# Patient Record
Sex: FEMALE | Race: WHITE | NOT HISPANIC OR LATINO | Employment: UNEMPLOYED | URBAN - NONMETROPOLITAN AREA
[De-identification: names, ages, dates, MRNs, and addresses within clinical notes are randomized per-mention and may not be internally consistent; named-entity substitution may affect disease eponyms.]

---

## 2017-11-07 ENCOUNTER — OFFICE VISIT (OUTPATIENT)
Dept: URGENT CARE | Facility: PHYSICIAN GROUP | Age: 57
End: 2017-11-07
Payer: OTHER GOVERNMENT

## 2017-11-07 VITALS
RESPIRATION RATE: 18 BRPM | BODY MASS INDEX: 22.49 KG/M2 | SYSTOLIC BLOOD PRESSURE: 120 MMHG | DIASTOLIC BLOOD PRESSURE: 64 MMHG | HEART RATE: 88 BPM | TEMPERATURE: 98 F | OXYGEN SATURATION: 100 % | HEIGHT: 65 IN | WEIGHT: 135 LBS

## 2017-11-07 DIAGNOSIS — F43.21 GRIEF REACTION: ICD-10-CM

## 2017-11-07 PROCEDURE — 99203 OFFICE O/P NEW LOW 30 MIN: CPT | Performed by: PHYSICIAN ASSISTANT

## 2017-11-07 RX ORDER — HYDROCORTISONE 20 MG/1
20 TABLET ORAL DAILY
COMMUNITY

## 2017-11-07 RX ORDER — FLUOXETINE HYDROCHLORIDE 20 MG/1
20 CAPSULE ORAL DAILY
Qty: 30 CAP | Refills: 0 | Status: SHIPPED | OUTPATIENT
Start: 2017-11-07

## 2017-11-07 RX ORDER — LEVOTHYROXINE SODIUM 0.05 MG/1
50 TABLET ORAL
COMMUNITY

## 2017-11-07 ASSESSMENT — ENCOUNTER SYMPTOMS
DEPRESSION: 1
ABDOMINAL PAIN: 0
VOMITING: 0
NERVOUS/ANXIOUS: 1
INSOMNIA: 1

## 2017-11-07 NOTE — PATIENT INSTRUCTIONS
Generalized Anxiety Disorder  Generalized anxiety disorder (NINI) is a mental disorder. It interferes with life functions, including relationships, work, and school.  NINI is different from normal anxiety, which everyone experiences at some point in their lives in response to specific life events and activities. Normal anxiety actually helps us prepare for and get through these life events and activities. Normal anxiety goes away after the event or activity is over.   NINI causes anxiety that is not necessarily related to specific events or activities. It also causes excess anxiety in proportion to specific events or activities. The anxiety associated with NINI is also difficult to control. NINI can vary from mild to severe. People with severe NINI can have intense waves of anxiety with physical symptoms (panic attacks).   SYMPTOMS  The anxiety and worry associated with NINI are difficult to control. This anxiety and worry are related to many life events and activities and also occur more days than not for 6 months or longer. People with NINI also have three or more of the following symptoms (one or more in children):  · Restlessness.    · Fatigue.  · Difficulty concentrating.    · Irritability.  · Muscle tension.  · Difficulty sleeping or unsatisfying sleep.  DIAGNOSIS  NINI is diagnosed through an assessment by your health care provider. Your health care provider will ask you questions about your mood, physical symptoms, and events in your life. Your health care provider may ask you about your medical history and use of alcohol or drugs, including prescription medicines. Your health care provider may also do a physical exam and blood tests. Certain medical conditions and the use of certain substances can cause symptoms similar to those associated with NINI. Your health care provider may refer you to a mental health specialist for further evaluation.  TREATMENT  The following therapies are usually used to treat NINI:    · Medication. Antidepressant medication usually is prescribed for long-term daily control. Antianxiety medicines may be added in severe cases, especially when panic attacks occur.    · Talk therapy (psychotherapy). Certain types of talk therapy can be helpful in treating NINI by providing support, education, and guidance. A form of talk therapy called cognitive behavioral therapy can teach you healthy ways to think about and react to daily life events and activities.  · Stress management techniques. These include yoga, meditation, and exercise and can be very helpful when they are practiced regularly.  A mental health specialist can help determine which treatment is best for you. Some people see improvement with one therapy. However, other people require a combination of therapies.     This information is not intended to replace advice given to you by your health care provider. Make sure you discuss any questions you have with your health care provider.     Document Released: 04/14/2014 Document Revised: 01/08/2016 Document Reviewed: 04/14/2014  [employer] Interactive Patient Education ©2016 [employer]  Complicated Grieving  Grief is a normal response to the death of someone close to you. Feelings of fear, anger, and guilt can affect almost everyone who loses a loved one. It is also common to have symptoms of depression while you are grieving. These include problems with sleep, loss of appetite, and lack of energy. They may last for weeks or months after a loss.  Complicated grief is different from normal grief or depression. Normal grieving involves sadness and feelings of loss, but these feelings are not constant. Complicated grief is a constant and severe type of grief. It interferes with your ability to function normally. It may last for several months to a year or longer. Complicated grief may require treatment from a mental health care provider.  CAUSES   It is not known why some people continue to struggle  with grief and others do not. You may be at higher risk for complicated grief if:  · The death of your loved one was sudden or unexpected.  · The death of your loved one was due to a violent event.  · Your loved one committed suicide.  · Your loved one was a child or a young person.  · You were very close to or dependent on the loved one.  · You have a history of depression.  SIGNS AND SYMPTOMS  Signs and symptoms of complicated grief may include:  · Feeling disbelief or numbness.  · Being unable to enjoy good memories of your loved one.  · Needing to avoid anything that reminds you of your loved one.  · Being unable to stop thinking about the death.  · Feeling intense anger or guilt.  · Feeling alone and hopeless.  · Feeling that your life is meaningless and empty.  · Losing the desire to live.  DIAGNOSIS  Your health care provider may diagnose complicated grief if:  · You have constant symptoms of grief for 6-12 months or longer.  · Your symptoms are interfering with your ability to live your life.  Your health care provider may want you to see a mental health care provider. Many symptoms of depression are similar to the symptoms of complicated grief. It is important to be evaluated for complicated grief along with other mental health conditions.  TREATMENT   Talk therapy with a mental health provider is the most common treatment for complicated grief. During therapy, you will learn heathy ways to cope with the loss of your loved one. In some cases, your mental health care provider may also recommend antidepressant medicines.  HOME CARE INSTRUCTIONS  · Take care of yourself.  ¨ Eat regular meals and maintain a healthy diet. Eat plenty of fruits, vegetables, and whole grains.  ¨ Try to get some exercise each day.  ¨ Keep regular hours for sleep. Try to get at least 8 hours of sleep each night.  · Do not use drugs or alcohol to ease your symptoms.  · Take medicines only as directed by your health care  provider.  · Spend time with friends and loved ones.  · Consider joining a grief (bereavement) support group to help you deal with your loss.  · Keep all follow-up visits as directed by your health care provider. This is important.  SEEK MEDICAL CARE IF:  · Your symptoms keep you from functioning normally.  · Your symptoms do not get better with treatment.  SEEK IMMEDIATE MEDICAL CARE IF:  · You have serious thoughts of hurting yourself or someone else.  · You have suicidal feelings.     This information is not intended to replace advice given to you by your health care provider. Make sure you discuss any questions you have with your health care provider.     Document Released: 12/18/2006 Document Revised: 01/08/2016 Document Reviewed: 05/28/2015  [employer] Interactive Patient Education ©2016 [employer].

## 2017-11-07 NOTE — PROGRESS NOTES
"Subjective:      Kiki Dimas is a 57 y.o. female who presents with Anxiety            Patient presents today with anxiety, depression, and grieving after the loss of her . She reports that her  has been sick for quite some time and he passed away this morning. Over the last week or so she has been quite anxious and has been crying very frequently. She also reports extreme difficulty sleeping due to the anxiety and depression. She would like to start some medication may help her through this grieving process. She does have family and friends close by who are very supportive.  She denies any prior history of anxiety or depression. She has never been on medication for these symptoms before. Denies any suicidal ideation. She is able to care for herself.        Review of Systems   Cardiovascular: Negative for chest pain.   Gastrointestinal: Negative for abdominal pain and vomiting.   Psychiatric/Behavioral: Positive for depression. Negative for suicidal ideas. The patient is nervous/anxious and has insomnia.        Allergies:Review of patient's allergies indicates no known allergies.    Current Outpatient Prescriptions Ordered in [facility]   Medication Sig Dispense Refill   • hydrocortisone (CORTEF) 20 MG Tab Take 20 mg by mouth every day.     • levothyroxine (SYNTHROID) 50 MCG Tab Take 50 mcg by mouth Every morning on an empty stomach.     • METFORMIN HCL PO Take  by mouth.     • fluoxetine (PROZAC) 20 MG Cap Take 1 Cap by mouth every day. 30 Cap 0     No current Epic-ordered facility-administered medications on file.        Past Medical History:   Diagnosis Date   • Thyroid disease        Social History   Substance Use Topics   • Smoking status: Never Smoker   • Smokeless tobacco: Never Used   • Alcohol use No       No family status information on file.   History reviewed. No pertinent family history.     Objective:     /64   Pulse 88   Temp 36.7 °C (98 °F)   Resp 18   Ht 1.651 m (5' 5\")   Wt 61.2 " kg (135 lb)   SpO2 100%   Breastfeeding? No   BMI 22.47 kg/m²      Physical Exam   Constitutional: She is oriented to person, place, and time. She appears well-developed and well-nourished. No distress.   HENT:   Head: Normocephalic and atraumatic.   Right Ear: External ear normal.   Left Ear: External ear normal.   Neck: Normal range of motion. Neck supple.   Cardiovascular: Normal rate.    Pulmonary/Chest: Effort normal.   Neurological: She is alert and oriented to person, place, and time.   Skin: Skin is warm and dry. She is not diaphoretic.   Psychiatric: Her speech is normal and behavior is normal. Judgment and thought content normal. Cognition and memory are normal. She exhibits a depressed mood.   Tearful at times   Nursing note and vitals reviewed.              Assessment/Plan:     1. Grief reaction  fluoxetine (PROZAC) 20 MG Cap    Secondary to the passing of her . Anxiety and depression today. Discussed options. Start Prozac. OTC sleep aids as needed. Follow-up with Dr. Vasquez as [employer] Interactive Patient Education given: Grief, anxiety    Please note that this dictation was created using voice recognition software. I have made every reasonable attempt to correct obvious errors, but I expect that there are errors of grammar and possibly content that I did not discover before finalizing the note.

## 2019-11-29 ENCOUNTER — OFFICE VISIT (OUTPATIENT)
Dept: RETAIL CLINIC | Facility: CLINIC | Age: 59
End: 2019-11-29

## 2019-11-29 VITALS
SYSTOLIC BLOOD PRESSURE: 119 MMHG | RESPIRATION RATE: 16 BRPM | OXYGEN SATURATION: 99 % | DIASTOLIC BLOOD PRESSURE: 85 MMHG | HEART RATE: 75 BPM

## 2019-11-29 DIAGNOSIS — Z76.0 MEDICATION REFILL: ICD-10-CM

## 2019-11-29 DIAGNOSIS — E03.9 HYPOTHYROIDISM, UNSPECIFIED TYPE: Primary | ICD-10-CM

## 2019-11-29 PROCEDURE — 99213 OFFICE O/P EST LOW 20 MIN: CPT | Performed by: NURSE PRACTITIONER

## 2019-11-29 RX ORDER — LEVOTHYROXINE SODIUM 0.07 MG/1
TABLET ORAL
Refills: 0 | COMMUNITY
Start: 2019-08-29 | End: 2019-11-29 | Stop reason: SDUPTHER

## 2019-11-29 RX ORDER — LEVOTHYROXINE SODIUM 0.07 MG/1
75 TABLET ORAL DAILY
Qty: 34 TABLET | Refills: 0 | Status: SHIPPED | OUTPATIENT
Start: 2019-11-29

## 2019-11-29 RX ORDER — POTASSIUM CHLORIDE 750 MG/1
10 TABLET, FILM COATED, EXTENDED RELEASE ORAL 2 TIMES DAILY
Refills: 3 | COMMUNITY
Start: 2019-10-09

## 2019-11-29 RX ORDER — FLUDROCORTISONE ACETATE 0.1 MG/1
TABLET ORAL DAILY
Refills: 4 | COMMUNITY
Start: 2019-10-21

## 2019-11-29 RX ORDER — SIMVASTATIN 20 MG
20 TABLET ORAL DAILY
Refills: 2 | COMMUNITY
Start: 2019-11-12

## 2019-11-29 NOTE — PROGRESS NOTES
Chief Complaint   Patient presents with   • Med Refill     Subjective   Kay Mack is a 59 y.o. female who presents to the clinic today with complaints of: was called here from out of town to care for her mother for several weeks and is almost out of her Levothyroxine. She needs a refill today or she will need to cut them in half. She brought the medication bottle her. She has had a complete thyroidectomy.   HPI      Current Outpatient Medications:   •  fludrocortisone 0.1 MG tablet, Take  by mouth Daily., Disp: , Rfl: 4  •  metFORMIN (GLUCOPHAGE) 500 MG tablet, Take 500 mg by mouth Daily., Disp: , Rfl: 2  •  potassium chloride (K-DUR) 10 MEQ CR tablet, Take 10 mEq by mouth 2 (Two) Times a Day., Disp: , Rfl: 3  •  simvastatin (ZOCOR) 20 MG tablet, Take 20 mg by mouth Daily., Disp: , Rfl: 2  •  levothyroxine (SYNTHROID) 75 MCG tablet, Take 1 tablet by mouth Daily. On Monday-Saturday and 2 tabs on Sunday, Disp: 34 tablet, Rfl: 0    Allergies:  Patient has no known allergies.    Past Medical History:   Diagnosis Date   • Disease of thyroid gland    • Elevated cholesterol      Past Surgical History:   Procedure Laterality Date   • CHOLECYSTECTOMY     • HYSTERECTOMY     • THYROIDECTOMY       No family history on file.  Social History     Tobacco Use   • Smoking status: Former Smoker   • Smokeless tobacco: Never Used   • Tobacco comment: quit in 1995   Substance Use Topics   • Alcohol use: Yes     Comment: seldom   • Drug use: No       Review of Systems  Review of Systems   Constitutional: Negative for activity change, appetite change and fatigue.   Respiratory: Negative.    Cardiovascular: Negative.    Gastrointestinal: Negative for constipation, diarrhea, nausea and vomiting.   Endocrine: Negative for cold intolerance and heat intolerance.   Skin: Negative for pallor.   Neurological: Negative for dizziness and headaches.       Objective   /85 (BP Location: Left arm, Patient Position: Sitting, Cuff Size: Adult)    Pulse 75   Resp 16   SpO2 99%       Physical Exam   Constitutional: She is oriented to person, place, and time. She appears well-developed and well-nourished.   HENT:   Head: Normocephalic and atraumatic.   Right Ear: External ear normal.   Left Ear: External ear normal.   Nose: Nose normal.   Mouth/Throat: Oropharynx is clear and moist. No oropharyngeal exudate.   Eyes: Pupils are equal, round, and reactive to light.   Neck: Normal range of motion. Neck supple. No thyromegaly present.   Cardiovascular: Normal rate, regular rhythm and normal heart sounds.   Pulmonary/Chest: Effort normal and breath sounds normal.   Neurological: She is alert and oriented to person, place, and time.   Skin: Skin is warm and dry.   Psychiatric: She has a normal mood and affect.   Vitals reviewed.      Assessment/Plan     Kay was seen today for med refill.    Diagnoses and all orders for this visit:    Hypothyroidism, unspecified type    Medication refill    Other orders  -     levothyroxine (SYNTHROID) 75 MCG tablet; Take 1 tablet by mouth Daily. On Monday-Saturday and 2 tabs on Sunday        Follow up with family physician once home

## 2019-11-29 NOTE — PATIENT INSTRUCTIONS
Follow up with family physician once home     Hypothyroidism    Hypothyroidism is when the thyroid gland does not make enough of certain hormones (it is underactive). The thyroid gland is a small gland located in the lower front part of the neck, just in front of the windpipe (trachea). This gland makes hormones that help control how the body uses food for energy (metabolism) as well as how the heart and brain function. These hormones also play a role in keeping your bones strong. When the thyroid is underactive, it produces too little of the hormones thyroxine (T4) and triiodothyronine (T3).  What are the causes?  This condition may be caused by:  · Hashimoto's disease. This is a disease in which the body's disease-fighting system (immune system) attacks the thyroid gland. This is the most common cause.  · Viral infections.  · Pregnancy.  · Certain medicines.  · Birth defects.  · Past radiation treatments to the head or neck for cancer.  · Past treatment with radioactive iodine.  · Past exposure to radiation in the environment.  · Past surgical removal of part or all of the thyroid.  · Problems with a gland in the center of the brain (pituitary gland).  · Lack of enough iodine in the diet.  What increases the risk?  You are more likely to develop this condition if:  · You are female.  · You have a family history of thyroid conditions.  · You use a medicine called lithium.  · You take medicines that affect the immune system (immunosuppressants).  What are the signs or symptoms?  Symptoms of this condition include:  · Feeling as though you have no energy (lethargy).  · Not being able to tolerate cold.  · Weight gain that is not explained by a change in diet or exercise habits.  · Lack of appetite.  · Dry skin.  · Coarse hair.  · Menstrual irregularity.  · Slowing of thought processes.  · Constipation.  · Sadness or depression.  How is this diagnosed?  This condition may be diagnosed based on:  · Your symptoms, your  medical history, and a physical exam.  · Blood tests.  You may also have imaging tests, such as an ultrasound or MRI.  How is this treated?  This condition is treated with medicine that replaces the thyroid hormones that your body does not make. After you begin treatment, it may take several weeks for symptoms to go away.  Follow these instructions at home:  · Take over-the-counter and prescription medicines only as told by your health care provider.  · If you start taking any new medicines, tell your health care provider.  · Keep all follow-up visits as told by your health care provider. This is important.  ? As your condition improves, your dosage of thyroid hormone medicine may change.  ? You will need to have blood tests regularly so that your health care provider can monitor your condition.  Contact a health care provider if:  · Your symptoms do not get better with treatment.  · You are taking thyroid replacement medicine and you:  ? Sweat a lot.  ? Have tremors.  ? Feel anxious.  ? Lose weight rapidly.  ? Cannot tolerate heat.  ? Have emotional swings.  ? Have diarrhea.  ? Feel weak.  Get help right away if you have:  · Chest pain.  · An irregular heartbeat.  · A rapid heartbeat.  · Difficulty breathing.  Summary  · Hypothyroidism is when the thyroid gland does not make enough of certain hormones (it is underactive).  · When the thyroid is underactive, it produces too little of the hormones thyroxine (T4) and triiodothyronine (T3).  · The most common cause is Hashimoto's disease, a disease in which the body's disease-fighting system (immune system) attacks the thyroid gland. The condition can also be caused by viral infections, medicine, pregnancy, or past radiation treatment to the head or neck.  · Symptoms may include weight gain, dry skin, constipation, feeling as though you do not have energy, and not being able to tolerate cold.  · This condition is treated with medicine to replace the thyroid hormones  that your body does not make.  This information is not intended to replace advice given to you by your health care provider. Make sure you discuss any questions you have with your health care provider.  Document Released: 12/18/2006 Document Revised: 11/28/2018 Document Reviewed: 11/28/2018  [employer] Interactive Patient Education © 2019 [employer].

## 2023-10-19 ENCOUNTER — OFFICE VISIT (OUTPATIENT)
Dept: URGENT CARE | Facility: PHYSICIAN GROUP | Age: 63
End: 2023-10-19
Payer: OTHER GOVERNMENT

## 2023-10-19 VITALS
BODY MASS INDEX: 29.45 KG/M2 | OXYGEN SATURATION: 94 % | RESPIRATION RATE: 22 BRPM | TEMPERATURE: 101.4 F | SYSTOLIC BLOOD PRESSURE: 100 MMHG | HEART RATE: 105 BPM | WEIGHT: 177 LBS | DIASTOLIC BLOOD PRESSURE: 70 MMHG

## 2023-10-19 DIAGNOSIS — R40.4 ALTERED LEVEL OF CONSCIOUSNESS: ICD-10-CM

## 2023-10-19 DIAGNOSIS — R50.9 FEVER, UNSPECIFIED FEVER CAUSE: ICD-10-CM

## 2023-10-19 DIAGNOSIS — R06.82 TACHYPNEA: ICD-10-CM

## 2023-10-19 DIAGNOSIS — R00.0 TACHYCARDIA, UNSPECIFIED: ICD-10-CM

## 2023-10-19 PROCEDURE — 3074F SYST BP LT 130 MM HG: CPT | Performed by: PHYSICIAN ASSISTANT

## 2023-10-19 PROCEDURE — 99205 OFFICE O/P NEW HI 60 MIN: CPT | Performed by: PHYSICIAN ASSISTANT

## 2023-10-19 PROCEDURE — 3078F DIAST BP <80 MM HG: CPT | Performed by: PHYSICIAN ASSISTANT

## 2023-10-19 ASSESSMENT — ENCOUNTER SYMPTOMS: FEVER: 1

## 2023-10-19 NOTE — PROGRESS NOTES
Subjective:     Kiki Dimas  is a 63 y.o. female who presents for Congestion (Trouble breathing, some coughing, unsure if had fever, wheezing, fatigue- sometimes hard time waking up , started last night.  positive covid)       She presents today, with her , for concern of sickness.  Has been was the primary historian during today's office visit as the patient was having altered level of consciousness.  Patient did have minimal communication with myself today, I would ask her questions but she would not give a undecipherable response.  She was recently exposed to COVID and they have concern of a COVID infection.   does note that patient was difficult to arouse this morning and has been experiencing fluctuating levels of consciousness.  It was noted that the  did have difficulty getting patient into the vehicle to transport her to the urgent care today.       Review of Systems   Constitutional:  Positive for fever and malaise/fatigue.   Neurological:         Altered level of consciousness      No Known Allergies  Past Medical History:   Diagnosis Date    Thyroid disease         Objective:   /70   Pulse (!) 105   Temp (!) 38.6 °C (101.4 °F) (Temporal)   Resp (!) 22   Wt 80.3 kg (177 lb)   SpO2 94%   BMI 29.45 kg/m²   Physical Exam  Vitals and nursing note reviewed.   Constitutional:       General: She is not in acute distress.     Appearance: She is ill-appearing and toxic-appearing.   HENT:      Head: Normocephalic.      Nose: No rhinorrhea.   Eyes:      General: No scleral icterus.     Conjunctiva/sclera: Conjunctivae normal.   Cardiovascular:      Rate and Rhythm: Tachycardia present.   Pulmonary:      Effort: Respiratory distress present.      Breath sounds: No stridor.      Comments: Tachypnea  Musculoskeletal:      Cervical back: Neck supple.   Neurological:      Mental Status: She is alert. She is disoriented.   Psychiatric:         Mood and Affect: Mood is depressed.          Speech: She is noncommunicative. Speech is slurred.         Behavior: Behavior is slowed.             Diagnostic testing: None    Assessment/Plan:     Encounter Diagnoses   Name Primary?    Altered level of consciousness     Fever, unspecified fever cause     Tachycardia, unspecified     Tachypnea           Plan for care for today's complaint includes contacting EMS for immediate transport to emergency department based on patient's current symptoms.  She is ill-appearing and is having altered level of consciousness during examination and when asked questions she gives undecipherable responses.  Upon entering the room today patient was laying on the table and there was difficulty to arouse her to have her set up for examination today.  Vitals do show temperature, tachycardia and tachypnea.  Based on current vital signs and appearance of the patient higher level of care is needed for timely evaluation of her underlying conditions, I do have concern for possible sepsis or systemic infection occurring at this time.  Patient Was started on 1 L nasal cannula of oxygen prior to EMS arrival..    See AVS Instructions below for written guidance provided to patient on after-visit management and care in addition to our verbal discussion during the visit.    Please note that this dictation was created using voice recognition software. I have attempted to correct all errors, but there may be sound-alike, spelling, grammar and possibly content errors that I did not discover before finalizing the note.    Jermaine Kwan PA-C